# Patient Record
Sex: FEMALE | URBAN - METROPOLITAN AREA
[De-identification: names, ages, dates, MRNs, and addresses within clinical notes are randomized per-mention and may not be internally consistent; named-entity substitution may affect disease eponyms.]

---

## 2022-12-21 ENCOUNTER — APPOINTMENT (RX ONLY)
Dept: URBAN - METROPOLITAN AREA CLINIC 96 | Facility: CLINIC | Age: 57
Setting detail: DERMATOLOGY
End: 2022-12-21

## 2022-12-21 DIAGNOSIS — D49.2 NEOPLASM OF UNSPECIFIED BEHAVIOR OF BONE, SOFT TISSUE, AND SKIN: ICD-10-CM

## 2022-12-21 PROCEDURE — ? COUNSELING

## 2022-12-21 PROCEDURE — ? OTHER

## 2022-12-21 PROCEDURE — 99202 OFFICE O/P NEW SF 15 MIN: CPT

## 2022-12-21 ASSESSMENT — LOCATION ZONE DERM: LOCATION ZONE: LEG

## 2022-12-21 ASSESSMENT — LOCATION SIMPLE DESCRIPTION DERM: LOCATION SIMPLE: RIGHT CALF

## 2022-12-21 ASSESSMENT — LOCATION DETAILED DESCRIPTION DERM: LOCATION DETAILED: RIGHT PROXIMAL CALF

## 2022-12-21 NOTE — PROCEDURE: OTHER
Render Risk Assessment In Note?: no
Other (Free Text): Explained that lesion is either blister or benign tag. Reassured pt benign nature of both. Pt to return if changes or worsens.
Note Text (......Xxx Chief Complaint.): This diagnosis correlates with the
Detail Level: Zone